# Patient Record
Sex: FEMALE | Race: WHITE | NOT HISPANIC OR LATINO | URBAN - METROPOLITAN AREA
[De-identification: names, ages, dates, MRNs, and addresses within clinical notes are randomized per-mention and may not be internally consistent; named-entity substitution may affect disease eponyms.]

---

## 2023-09-08 ENCOUNTER — EMERGENCY (EMERGENCY)
Facility: HOSPITAL | Age: 53
LOS: 1 days | Discharge: HOME | End: 2023-09-08
Admitting: EMERGENCY MEDICINE
Payer: MEDICAID

## 2023-09-08 VITALS
HEART RATE: 68 BPM | TEMPERATURE: 98 F | DIASTOLIC BLOOD PRESSURE: 96 MMHG | OXYGEN SATURATION: 97 % | SYSTOLIC BLOOD PRESSURE: 158 MMHG | RESPIRATION RATE: 18 BRPM | WEIGHT: 238.1 LBS | HEIGHT: 66.14 IN

## 2023-09-08 PROCEDURE — 99283 EMERGENCY DEPT VISIT LOW MDM: CPT

## 2023-09-08 NOTE — ED PROVIDER NOTE - PHYSICAL EXAMINATION
General: well developed, well nourished, no distress  Eye: bilateral: PERRL, EOMI  Ears, Nose, Throat: normal pharynx, TMs normal, membranes moist.  Neck: non-tender, full range of motion, supple.  Negative For: lymphadenopathy (R), lymphadenopathy (L)  Respiratory: CTAB.  Cardiovascular: S1-S2 normal, regular rate, regular rhythm.  Abdomen: normal bowel sounds, non tender, soft.    Genitourinary: Negative For: CVA tenderness  Musculoskeletal: normal gait.  Negative For: back pain, upper, back pain  Extremities: normal range of motion, non-tender.  Negative For: edema (R), edema (L), calf tenderness (R), calf tenderness (L), swelling  Extremity Strength: upper extremities equal bilateral: 5/5, lower extremities equal bilateral: 5/5  Neurologic: alert, oriented to person, oriented to place, oriented to time.    Skin: normal color.  Negative For: rash  Keloid appreciated on the posterior aspect of the left ankle.  No open wounds, discharge, fluctuance, erythema, tenderness to palpation appreciated.  No calf involvement.  No streaking appreciated.  Psychiatric: normal affect, normal insight, normal concentration

## 2023-09-08 NOTE — ED ADULT NURSE NOTE - CHIEF COMPLAINT QUOTE
Pt walks in c/o wound to L foot/heel worsening the last few days. Pt had sx 1 year ago and had permanent sutures placed. When patient was in [place], some sutures began surfacing and surgeon removed them. Pt has taken antibiotics and creams without relief.

## 2023-09-08 NOTE — ED PROVIDER NOTE - PATIENT PORTAL LINK FT
You can access the FollowMyHealth Patient Portal offered by [facility] by registering at the following website: [email or web address]. By joining [facility]’s FollowMyHealth portal, you will also be able to view your health information using other applications (apps) compatible with our system.

## 2023-09-08 NOTE — ED ADULT NURSE NOTE - NSFALLUNIVINTERV_ED_ALL_ED
Bed/Stretcher in lowest position, wheels locked, appropriate side rails in place/Call bell, personal items and telephone in reach/Instruct patient to call for assistance before getting out of bed/chair/stretcher/Non-slip footwear applied when patient is off stretcher/Umbarger to call system/Physically safe environment - no spills, clutter or unnecessary equipment/Purposeful proactive rounding/Room/bathroom lighting operational, light cord in reach

## 2023-09-08 NOTE — ED PROVIDER NOTE - OBJECTIVE STATEMENT
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  Patient presents with her daughter who translated for patient.  States that patient had Achilles tendon rupture with surgical repair in the right shoulder 1 year ago.  States that since then patient has had this chronic wound that has closed significantly.  Daughter states that the stitches that were placed, patient's body rejected, so they had to be taken out more at a time.  States that she believes there is a piece of a stitch in it and she would like it taken out.  Attempted to follow-up with a specialist, however insurance problems happened.  Denies any fevers, open wounds, discharge, myalgias, erythema or fluctuance.

## 2023-09-08 NOTE — ED PROVIDER NOTE - CLINICAL SUMMARY MEDICAL DECISION MAKING FREE TEXT BOX
53-year-old female presents emerged part for evaluation of acute weight  Patient supposed follow-up with wound care  Results reviewed with patient.  Patient understands and agrees with plan.  Agreed to follow-up primary care doctor in 2 to 3 days.

## 2023-09-08 NOTE — ED ADULT TRIAGE NOTE - CHIEF COMPLAINT QUOTE
Pt walks in c/o wound to L foot/heel worsening the last few days. Pt had sx 1 year ago and had permanent sutures placed. When patient was in [place], some sutures began surfacing and surgeon removed them. Pt has taken antibiotics and creams without relief.

## 2023-09-08 NOTE — ED ADULT NURSE NOTE - OBJECTIVE STATEMENT
Patient comes to the ED for a wound check on her left heel. As her patient, she had surgery approximately one year ago in [place] and had permanent sutures placed. The sutures began to resurface and raised concern with patient. Patient is afebrile, no signs of distress noted, VSS, all safety measures in place. Will continue to provide care for patient.

## 2023-09-12 DIAGNOSIS — L91.0 HYPERTROPHIC SCAR: ICD-10-CM

## 2023-09-12 DIAGNOSIS — I10 ESSENTIAL (PRIMARY) HYPERTENSION: ICD-10-CM

## 2024-05-29 NOTE — ED ADULT TRIAGE NOTE - ARRIVAL FROM
Stephenie is a 37 year old       who is here today for an annual well-woman exam.     She has been having issues with bilateral breast tenderness and she will feel her cysts are bigger on and off.  She does not feel like this occurs cyclically and it is more randomly occurring.  She has resolution of her discomfort with a tight fitting bra, but uncomfortable if anything hits her breast.    Social History:  She denies any abuse.    Obstetric History:        with normal spontaneous vaginal delivery.    Gynecologic History:  No LMP recorded (lmp unknown). (Menstrual status: Intrauterine Device).    Contraceptions:  Mirena intrauterine device and vasectomy.  She has no history of abnormal pap smears.  She has no history of STD.  She has no dyspareunia.  She has no incontinence.    [facility]-Cuzick Risk Scores as of 2024     [facility]       Patient Population    Breast cancer  (CMD) 5-year 0.59% 0.58%    Breast cancer  (CMD) 10-year 1.5% 1.49%    Breast cancer  (CMD) lifetime 11.12% 10.86%    Breast cancer genetic susceptibility 0.15%     Breast cancer, BRCA1 positive  (CMD) 0.04%     Breast cancer, BRCA2 positive  (CMD) 0.11%                      I have reviewed the patient's vital signs, medications and allergies, past medical, surgical, social and family history, updating these as appropriate.  See Histories section of the EMR for a display of this information.    Medications:  Current Outpatient Medications   Medication Sig Dispense Refill   • spironolactone (ALDACTONE) 50 MG tablet Take one tablet every morning 30 tablet 0   • minocycline (MINOCIN) 100 MG capsule TAKE 2  CAPSULE BY MOUTH  DAILY 60 capsule 2   • tazarotene (Tazorac) 0.1 % cream Apply daily to cheeks 30 g 1   • fluconazole (DIFLUCAN) 150 MG tablet TAKE ONE TABLET ONCE FOR YEAST INFECTION AS NEEDED 5 tablet 0   • cetirizine (ZYRTEC) 10 MG tablet Take 10 mg by mouth daily. Indications: Hayfever     • Cholecalciferol (VITAMIN D PO)      •  Ascorbic Acid (VITAMIN C PO)      • levonorgestrel (MIRENA) 20 MCG/24HR IUD 1 each by Intrauterine route once.       No current facility-administered medications for this visit.        Review of Systems:  Breasts:  She reports breast lumps or pain.  Genitourinary:  She denies dysuria, urinary frequency or urgency.  denies abnormal discharge, odor, vaginal itching or rash/bumps.  Psychiatric:  denies problems with mood or depression, but she does have issues with anxiety, specifically with driving.  She denies any need for treatment.    Physical Examination:  Blood pressure 120/64, height 5' 10\" (1.778 m), weight 94 kg (207 lb 3.7 oz), not currently breastfeeding.   General:  This is a pleasant female who is in no acute distress and alert and oriented x3 with appropriate mood and affect.  Head:  Normocephalic and atraumatic.  Neck:  Trachea midline, thyroid without tenderness, enlargement or masses.  No cervical lymphadenopathy.  Respiratory:  Normal respiratory effort with lungs clear to auscultation bilaterally without wheezes, rales or rhonchi.  Cardiovascular:  Heart regular rate and rhythm without murmurs, rubs or gallops.  Lower extremities nontender with no pretibial edema.  Breasts:  Normal symmetry and contour without any masses, tenderness, nipple discharge or axillary lymphadenopathy palpated bilaterally.  Abdomen:  Soft, nontender, nondistended, without rebound, rigidity, guarding or masses.  No hepatosplenomegaly.    Genitourinary:  Normal external female genitalia.  Normal urethral meatus.  On speculum examination normal vaginal mucosa.  Normal-appearing cervix without any lesions. IUD strings normal..  No abnormal discharge was in the vagina.  On bimanual examination:  No urethral tenderness or masses.  No bladder tenderness or masses.  No cervical motion tenderness.  Uterus Normal size, shape and consistency, nontender, mobile , and globular.  No adnexal tenderness or masses palpated  bilaterally.    Assessment and Plan:  This is a 37 year old       here for an annual well woman exam who is doing well.    Pap testing was not done today.  She had a normal pap smear with Negative HPV testing in 2020.  Discussed breast self-awareness.    [place]hat Risk Lifetime Risk of breast cancer: 11.12%.  Mammogram at 38 years of age  Mastodynia--History of bilateral breast cyst.  Discussed OTC that could help.  Vaccinations--Tdap given today  Laboratory testing--cholesterol and glucose screening ordered.  Contraception--vasectomy.  Mirena IUD placed 2019 and working well     Home